# Patient Record
Sex: FEMALE | Race: WHITE | NOT HISPANIC OR LATINO
[De-identification: names, ages, dates, MRNs, and addresses within clinical notes are randomized per-mention and may not be internally consistent; named-entity substitution may affect disease eponyms.]

---

## 2016-12-31 NOTE — ED ADULT NURSE NOTE - OBJECTIVE STATEMENT
pt received awake and alert, as per daughter pt had a witnessed fall yesterday and has been coughing today, pt lives home with an aid, redness noted to sacral area, skin tear noted to left arm, bandaged, pt appears to be in NAD, md at bedside, breathing even and unlabored, vs as reported, will continue to monitor .

## 2016-12-31 NOTE — ED PROVIDER NOTE - OBJECTIVE STATEMENT
68 yo F with hx of AFib and HTN p/w resp distress and diff breathing. Pt is frail, noted by her aide to have difficulty breathing today and was sent to ED for eval. Pt is minimally verbal at baseline, frail, DNR/DNI as per Son (HCP- Dr. Mancuso). Pt not able to contribute to hx., Daughter at bedside adds that she had a fall yesterday, did not hit her head. 89 yo F with hx of AFib and HTN p/w resp distress and diff breathing. Pt is frail, noted by her aide to have difficulty breathing today and was sent to ED for eval. Pt is minimally verbal at baseline, frail, DNR/DNI as per Son (HCP- Dr. Mancuso). Pt not able to contribute to hx., Daughter at bedside adds that she had a fall yesterday, did not hit her head.

## 2016-12-31 NOTE — ED PROVIDER NOTE - CRITICAL CARE PROVIDED
documentation/interpretation of diagnostic studies/consultation with other physicians/conducted a detailed discussion of DNR status/direct patient care (not related to procedure)/consult w/ pt's family directly relating to pts condition/additional history taking

## 2016-12-31 NOTE — ED PROVIDER NOTE - ATTENDING CONTRIBUTION TO CARE
Dr. Smith: I have personally performed a face to face bedside history and physical examination of this patient. I have discussed the history, examination, review of systems, assessment and plan of management with the resident. I have reviewed the electronic medical record and amended it to reflect my history, review of systems, physical exam, assessment and plan.  89F h/o dementia, non verbal, Afib not on AC, HTN, DNR/DNI (HCP son and daughter), brought in by daughter for diff breathing, fevers, cough x 3 days. No recent hospitalizations. Pt also had a mechanical fall with walker and aid few days ago but did not hit head.  On exam pt is cachectic, tachypneic but not in distress, tachy, irreg irreg, coarse rhonchi bilaterally, abdo soft/nt/nd, 2cm x 4cm skin tear on dorsal surface of LUE forearm. No signs of head trauma  Plan - cap vs flu, rvp, labs, cxr, abx, xeroderm.  GOC discussed with daughter, wants admission for abx.

## 2016-12-31 NOTE — PATIENT PROFILE ADULT. - ABILITY TO HEAR (WITH HEARING AID OR HEARING APPLIANCE IF NORMALLY USED):
Mildly to Moderately Impaired: difficulty hearing in some environments or speaker may need to increase volume or speak distinctly/using hearing aid

## 2016-12-31 NOTE — ED PROVIDER NOTE - PMH
Atrial Fibrillation    CA - Lung Cancer    CAD (Coronary Artery Disease)    CHF (Congestive Heart Failure)    CVA (cerebral infarction)    PVD (Peripheral Vascular Disease)

## 2016-12-31 NOTE — ED PROVIDER NOTE - PSH
Cancer of the Lung  left wedge resection 1998  Cataract    History of Varicose Veins  repair 30 years ago  s/p Angioplasty with Stent  1998

## 2016-12-31 NOTE — PATIENT PROFILE ADULT. - NS PRO CONTRA FLU 1
yes/not sure not sure-update 1/7/17 Patient unable answer correctly-no family at bedside to clarify/unable to assess immunization status

## 2016-12-31 NOTE — ED PROVIDER NOTE - MEDICAL DECISION MAKING DETAILS
resp distress, code sepsis called. treat for CAP. pt is DNR, DNI. cxr, ivf, IV levaquin, tylenol, cbc, cmp, blood cx, vbg

## 2016-12-31 NOTE — ED PROVIDER NOTE - ENMT, MLM
Airway patent, Nasal mucosa clear. Dry Mouth with normal mucosa. Throat has no vesicles, no oropharyngeal exudates and uvula is midline.

## 2017-01-01 NOTE — H&P ADULT. - PROBLEM SELECTOR PLAN 1
- 2/2 PNA and influenza  - Also with hypoxia- continue NC  - Continue antibiotics. f/u blood culture  - Start tamiflu  - tylenol prn

## 2017-01-01 NOTE — H&P ADULT. - HISTORY OF PRESENT ILLNESS
89 yo F with h/o HTN, A fib on xarelto, CHF, PVD  brought by home aid due to cough and tachypnea. family at bedside, aid not present. Unable to obtain history from pt due to dementia. Per family, pt has been weak for the lass 3 days. She has also had a productive cough. Unclear if pt was febrile at home but was febrile in ED. She has no known sick contacts, no travel.     ED VS: 182/100  78  [id number]  24  89% on RA  Pt was given lexofloxacin 750mg IV, tylenol 65mg and tdap 89 yo F with h/o HTN, A fib on xarelto, CHF, PVD  brought by home aid due to cough and tachypnea. family at bedside, aid not present. Unable to obtain history from pt due to dementia. Per family, pt has been weak for the lass 3 days. She has also had a productive cough. Unclear if pt was febrile at home but was febrile in ED. She has no known sick contacts, no travel. Family was told by HHA that she fell, no LOC ad no head trauma- not witnessed by family     ED VS: 182/100  78  [id number]  24  89% on RA  Pt was given lexofloxacin 750mg IV, tylenol 65mg and tdap 89 yo F with h/o HTN, A fib on xarelto, CHF, PVD  brought by home aid due to cough and tachypnea. family at bedside, aid not present. Unable to obtain history from pt due to dementia. Per family, pt has been weak for the lass 3 days. She has also had a productive cough. Unclear if pt was febrile at home but was febrile in ED. She has no known sick contacts, no travel. Family was told by home aid that she fell, no LOC ad no head trauma- not witnessed by family     ED VS: 182/100  78  [id number]  24  89% on RA  Pt was given lexofloxacin 750mg IV, tylenol 65mg and tdap

## 2017-01-01 NOTE — H&P ADULT. - ASSESSMENT
89 yo F with h/o HTN, A fib on xarelto, CHF, PVD  brought by home aid due to cough and tachypnea. family at bedside, aid not present.

## 2017-01-01 NOTE — H&P ADULT. - PROBLEM SELECTOR PLAN 3
- Pt euvolemic  - Monitor fluid status  - Continue home meds in am - Pt euvolemic  - Monitor fluid status  - Continue home meds in am- confirm dose of ACE and HCTZ

## 2017-01-01 NOTE — H&P ADULT. - PROBLEM SELECTOR PLAN 2
- Rate controlled  - Continue metoprolol  - Contact HCP regarding continued need for full ac given history of falls, age and bleeding risk. Discontinue lovenox if decide to continue xarelto

## 2017-01-02 ENCOUNTER — TRANSCRIPTION ENCOUNTER (OUTPATIENT)
Age: 82
End: 2017-01-02

## 2017-01-02 NOTE — DISCHARGE NOTE ADULT - OTHER SIGNIFICANT FINDINGS
echo:1. Calcified trileaflet aortic valve with mildly decreased  opening. Mild aortic regurgitation.  2. Severely dilated left atrium.  LA volume index = 113  cc/m2.  3. Severe concentric left ventricular hypertrophy.  4. Normal left ventricular systolic function. No segmental  wall motion abnormalities. Prominent basal septum.  5. Severe diastolic dysfunction.  6. Severe right atrial enlargement.  7. The right ventricle is not well visualized, probably  enlarged.  8. Estimated pulmonary artery systolic pressure equals 70  mm Hg, assuming right atrial pressure equals 10  mm Hg,  consistent with severe pulmonary hypertension.  9. Bilateral pleural effusions.

## 2017-01-02 NOTE — DISCHARGE NOTE ADULT - HOSPITAL COURSE
89 yo F with h/o HTN, A fib on xarelto, CHF, PVD  brought by home aid due to cough and tachypnea. family at bedside, aid not present.    Hospital Course:    Sepsis- 2/2 PNA and influenza  -Pulm Dr. Mejia Following &ID Dr. Mace following   - Also with hypoxia- continue NC  - Continue antibiotics  -c/w bronchodilators & steroids  -CT chest:Multifocal pneumonia.   Extensive mucous plugging within the bilateral lower lobes and lingula.  Moderate loculated right pleural effusion and small right pleural   effusion.  -f/u echo ------------------------------------------  -blood and urine cultures: ntd   - Start tamiflu  - tylenol prn.   -Chest PT/Incentive Spirometry   -Speech and swallow:S&S: dysphagia 1 with honey thickened fluids     Chronic atrial fibrillation   - Rate controlled  - Continue metoprolol  -c/w xarelto     Chronic congestive heart failure   - Pt euvolemic  - Monitor fluid status  - c/w ACE and HCTZ    Essential hypertension  -c/w bp home regimen     DVT ppx   -c/w Xarelto 89 yo F with h/o HTN, A fib on xarelto, CHF, PVD  brought by home aid due to cough and tachypnea. family at bedside, aid not present.    Hospital Course:    SEPSIS 2/2 PNA/INFLUENZA  - Pulm Dr. Mejia  &ID Dr. Mace following   - blood and urine cultures: negative  - Levaquin, Flagyl x 7 days  - RVP -positive  - s/p Tamiflu completed 5 days on 1/5/17  - Bronchodilators & steroids  - No Thoracentesis planned, Xarelto d/c by Pulm on 1/8.  - CT chest:- Multifocal pneumonia. Extensive mucous plugging within the bilateral lower lobes and lingula. Moderate loculated right pleural effusion and small right pleural effusion  -CXR- Right lung appears unchanged with findings which may be due to a moderate pleural effusion with passive atelectasis. Underlying pneumonia is not excluded. Opacification of much of the left hemithorax, slightly increased in degree. The appearance may be due to a combination of pleural effusion, atelectasis, and/or pneumonia.  - ECHO: Normal LV function   - Chest PT/ Incentive Spirometry   - Speech and swallow:- S&S: dysphagia 1 with honey thickened fluids     ATRIAL FIBRILLATION  - Rate controlled  - Continue Metoprolol,  -Xarelto d/c by Pulm on 1/8/17  -continue aspirin     CONGESTIVE HEART FAILURE  - Pt euvolemic  - daily weights  - c/w ACE and HCTZ    HTN  -c/w bp home regimen     Dispo: Rehab

## 2017-01-02 NOTE — DISCHARGE NOTE ADULT - MEDICATION SUMMARY - MEDICATIONS TO STOP TAKING
I will STOP taking the medications listed below when I get home from the hospital:    rivaroxaban 15 mg oral tablet  -- 1 tab(s) by mouth once a day    meclizine 25 mg oral tablet  -- 1 tab(s) by mouth 3 times a day, As needed, Dizziness

## 2017-01-02 NOTE — PHYSICAL THERAPY INITIAL EVALUATION ADULT - CRITERIA FOR SKILLED THERAPEUTIC INTERVENTIONS
predicted duration of therapy intervention/therapy frequency/impairments found/anticipate D/C home w/ home PT but please follow/anticipated discharge recommendation

## 2017-01-02 NOTE — PHYSICAL THERAPY INITIAL EVALUATION ADULT - RANGE OF MOTION EXAMINATION, REHAB EVAL
(not formally assessed)/bilateral upper extremity ROM was WFL (within functional limits)/bilateral lower extremity ROM was WFL (within functional limits)

## 2017-01-02 NOTE — PHYSICAL THERAPY INITIAL EVALUATION ADULT - DISCHARGE DISPOSITION, PT EVAL
anticipate home w/ home PT and resumption of 24/7 HHA services, but please follow PT notes closely for when OOB is assessed

## 2017-01-02 NOTE — PHYSICAL THERAPY INITIAL EVALUATION ADULT - ADDITIONAL COMMENTS
Pt. is a poor historian, currently A & O x 1-2, confused, and w/ h/o dementia.  Per chart review, pt. has 24/7 HHA services.  Pt. reports ambulating w/ a RW w/ supervision from her HHA.  Assistance w/ ADL's required.    Pt. left in bed post-PT in NAD w/ all lines/tubes intact & call bell within reach.  RN Dianelys coley.

## 2017-01-02 NOTE — DISCHARGE NOTE ADULT - COMMUNITY RESOURCES
[facility] for rehab room # 338A tel # [phone], 4pm  via [facility] ambulance tel # [phone]

## 2017-01-02 NOTE — DISCHARGE NOTE ADULT - PATIENT PORTAL LINK FT
“You can access the [facility] Patient Portal, offered by [facility], by registering with the following website: [email or web address]”

## 2017-01-02 NOTE — PHYSICAL THERAPY INITIAL EVALUATION ADULT - PATIENT PROFILE REVIEW, REHAB EVAL
ACTIVITY: ambulate w/ assistance; consult w/ TYLOR Ivory --> pt. OK for dangling @ EOB if tolerated/yes

## 2017-01-02 NOTE — DISCHARGE NOTE ADULT - MEDICATION SUMMARY - MEDICATIONS TO TAKE
I will START or STAY ON the medications listed below when I get home from the hospital:    acetaminophen 325 mg oral tablet  -- 2 tab(s) by mouth every 6 hours, As needed, For Temp greater than 38 C (100.4 F)  -- Indication: For fever prn     acetaminophen 325 mg oral tablet  -- 2 tab(s) by mouth every 6 hours, As needed, Mild Pain (1 - 3)  -- Indication: For Pain    aspirin 81 mg oral delayed release tablet  -- 1 tab(s) by mouth once a day  -- Indication: For Atrial fibrillation    digoxin 125 mcg (0.125 mg) oral tablet  -- 1 tab(s) by mouth once a day  -- Indication: For Atrial fibrillation    simvastatin 10 mg oral tablet  -- 1 tab(s) by mouth once a day (at bedtime)  -- Indication: For hyperlipidemia     metoprolol extended release 25 mg oral tablet, extended release  -- 1 tab(s) by mouth once a day  -- Indication: For htn    albuterol-ipratropium 2.5 mg-0.5 mg/3 mL inhalation solution  -- 3 milliliter(s) inhaled every 6 hours  -- Indication: For CAP (community acquired pneumonia)    furosemide 20 mg oral tablet  -- 1 tab(s) by mouth once a day  -- Indication: For Chronic congestive heart failure, unspecified congestive heart failure type    hydrochlorothiazide 12.5 mg oral capsule  -- 1 cap(s) by mouth once a day  -- Indication: For htn    guaiFENesin 100 mg/5 mL oral liquid  -- 5 milliliter(s) by mouth every 6 hours  -- Indication: For Cough    ocular lubricant ophthalmic solution  -- 1 drop(s) to each affected eye 3 times a day  -- Indication: For dry eyes

## 2017-01-02 NOTE — PHYSICAL THERAPY INITIAL EVALUATION ADULT - ASSISTIVE DEVICE:SUPINE/SIT, REHAB EVAL
pt. sat @ EOB x ~ 15 seconds and then stated, "this is too stressful," requesting to return to supine --> TYLOR coley

## 2017-01-02 NOTE — DISCHARGE NOTE ADULT - CARE PLAN
Principal Discharge DX:	CAP (community acquired pneumonia) Principal Discharge DX:	CAP (community acquired pneumonia)  Goal:	Resolved  Instructions for follow-up, activity and diet:	Completed Antibiotic therapy while hospitalized.  Follow up with PCP within 1 week of discharge.  Secondary Diagnosis:	Atrial fibrillation  Goal:	Controlled  Instructions for follow-up, activity and diet:	Continue Xarelto as ordered.  Monitor for signs and symptoms of bleeding. Principal Discharge DX:	CAP (community acquired pneumonia)  Goal:	Resolved  Instructions for follow-up, activity and diet:	Completed Antibiotic therapy while hospitalized.  Follow up with PCP within 1 week of discharge.  Secondary Diagnosis:	Atrial fibrillation  Goal:	Controlled  Instructions for follow-up, activity and diet:	your Xarelto was discontinued risk outweigh benefit.  Continue Aspirin.   Monitor for signs and symptoms of bleeding.  Secondary Diagnosis:	CHF (congestive heart failure)  Goal:	continue current regimen  Secondary Diagnosis:	Flu  Goal:	completed course of tamiflu

## 2017-01-02 NOTE — DISCHARGE NOTE ADULT - PLAN OF CARE
Resolved Completed Antibiotic therapy while hospitalized.  Follow up with PCP within 1 week of discharge. Continue Xarelto as ordered.  Monitor for signs and symptoms of bleeding. Controlled continue current regimen completed course of tamiflu your Xarelto was discontinued risk outweigh benefit.  Continue Aspirin.   Monitor for signs and symptoms of bleeding.

## 2017-01-02 NOTE — PHYSICAL THERAPY INITIAL EVALUATION ADULT - PERTINENT HX OF CURRENT PROBLEM, REHAB EVAL
Pt. is a 89 y/o female adm. to [facility] on 12/31/16 w/ a dx of PNA and cough.  PT consult request 2/2 deconditioning.   H/O Dementia.  CXR = pleural effusion.  CT of chest = multifocal PNA.

## 2017-01-03 NOTE — SWALLOW BEDSIDE ASSESSMENT ADULT - SWALLOW EVAL: RECOMMENDED FEEDING/EATING TECHNIQUES
small sips/bites/oral hygiene/position upright (90 degrees)/check mouth frequently for oral residue/pocketing/no straws/allow for swallow between intakes

## 2017-01-03 NOTE — SWALLOW BEDSIDE ASSESSMENT ADULT - ASR SWALLOW ASPIRATION MONITOR
oral hygiene/pneumonia/upper respiratory infection/change of breathing pattern/position upright (90Y)/fever/throat clearing/gurgly voice/cough

## 2017-01-03 NOTE — SWALLOW BEDSIDE ASSESSMENT ADULT - SWALLOW EVAL: DIAGNOSIS
1. Mild-moderate oral dysphagia given puree, soft solids, and honey thick fluids marked by delayed and reduced A-P transit of the bolus.  Patient with increased mashing mastication pattern given soft solids.  2. Moderate pharyngeal dysphagia given above stated consistencies marked by delay in the trigger of the swallow and reduced laryngeal excursion.  Patient with multiple swallows of each bolus suggestive of pharyngeal stasis.  No overt s/s of laryngeal penetration noted.

## 2017-01-03 NOTE — SWALLOW BEDSIDE ASSESSMENT ADULT - ORAL PHASE
Delayed oral transit time/Decreased anterior-posterior movement of the bolus Decreased anterior-posterior movement of the bolus/Delayed oral transit time

## 2017-01-09 NOTE — DIETITIAN INITIAL EVALUATION ADULT. - DIET TYPE
DASH/TLC (sodium and cholesterol restricted diet)/dysphagia 1, pureed, honey consistency fluid/Kosher

## 2017-01-09 NOTE — DIETITIAN INITIAL EVALUATION ADULT. - OTHER INFO
Pt identified via length of stay screening. Pt 89 yo female appears confused @ time of visit. No family @ bed side. Noted Pt was NPO, PO diet resumed. S/P Swallow Bedside Assessment Adult, SLP rec: Dysphagia 1 with Honey Thick Liquids (1/3). No vomiting/diarrhea reported @ present. Case discussed with NP (ADS), nurse. DNR status noted. RDN remains available.

## 2017-01-09 NOTE — DIETITIAN INITIAL EVALUATION ADULT. - NS AS NUTRI INTERV MEALS SNACK
1. Suggest: PO diet rx: Dysphagia 1 Pureed Honey Consistency Fluid, Kosher; PO supplement: Ensure Pudding 1 can x 4 daily (will provide additional ~680 Kcal, ~16 gm Protein);           2. Encourage & assist Pt with meals; Monitor PO diet tolerance;              3. Monitor labs, weights, hydration status;/Other (specify)/Texture-modified diet

## 2017-01-09 NOTE — DIETITIAN INITIAL EVALUATION ADULT. - NS AS NUTRI INTERV MEDICAL AND FOOD SUPPLEMENTS
Ensure Pudding 1 can x 4 daily (will provide additional ~680 Kcal, ~16 gm Protein);/Commercial beverage

## 2025-05-01 NOTE — ED PROVIDER NOTE - UNABLE TO OBTAIN
Prior to immunization administration, verified patients identity using patient s name and date of birth. Please see Immunization Activity for additional information.     Screening Questionnaire for Adult Immunization    Are you sick today?   No   Do you have allergies to medications, food, a vaccine component or latex?   No   Have you ever had a serious reaction after receiving a vaccination?   No   Do you have a long-term health problem with heart, lung, kidney, or metabolic disease (e.g., diabetes), asthma, a blood disorder, no spleen, complement component deficiency, a cochlear implant, or a spinal fluid leak?  Are you on long-term aspirin therapy?   No   Do you have cancer, leukemia, HIV/AIDS, or any other immune system problem?   No   Do you have a parent, brother, or sister with an immune system problem?   No   In the past 3 months, have you taken medications that affect  your immune system, such as prednisone, other steroids, or anticancer drugs; drugs for the treatment of rheumatoid arthritis, Crohn s disease, or psoriasis; or have you had radiation treatments?   No   Have you had a seizure, or a brain or other nervous system problem?   No   During the past year, have you received a transfusion of blood or blood    products, or been given immune (gamma) globulin or antiviral drug?   No   For women: Are you pregnant or is there a chance you could become       pregnant during the next month?   No   Have you received any vaccinations in the past 4 weeks?   No     Immunization questionnaire answers were all negative.        Patient instructed to remain in clinic for 15 minutes afterwards, and to report any adverse reactions.     Screening performed by Nehal Chaves MA on 5/1/2025 at 1:28 PM.         Severe Illness/Injury